# Patient Record
Sex: MALE | Race: WHITE | NOT HISPANIC OR LATINO | Employment: FULL TIME | URBAN - METROPOLITAN AREA
[De-identification: names, ages, dates, MRNs, and addresses within clinical notes are randomized per-mention and may not be internally consistent; named-entity substitution may affect disease eponyms.]

---

## 2022-05-23 DIAGNOSIS — Z87.438 HISTORY OF BPH: Primary | ICD-10-CM

## 2022-07-08 ENCOUNTER — OFFICE VISIT (OUTPATIENT)
Dept: UROLOGY | Facility: CLINIC | Age: 57
End: 2022-07-08
Payer: COMMERCIAL

## 2022-07-08 VITALS
BODY MASS INDEX: 23.57 KG/M2 | RESPIRATION RATE: 18 BRPM | SYSTOLIC BLOOD PRESSURE: 142 MMHG | HEIGHT: 72 IN | TEMPERATURE: 98 F | DIASTOLIC BLOOD PRESSURE: 80 MMHG | HEART RATE: 87 BPM | WEIGHT: 174 LBS

## 2022-07-08 DIAGNOSIS — R35.1 NOCTURIA: ICD-10-CM

## 2022-07-08 DIAGNOSIS — R35.0 URINARY FREQUENCY: ICD-10-CM

## 2022-07-08 DIAGNOSIS — R39.15 URINARY URGENCY: ICD-10-CM

## 2022-07-08 DIAGNOSIS — N13.8 BPH WITH URINARY OBSTRUCTION: Primary | ICD-10-CM

## 2022-07-08 DIAGNOSIS — N40.1 BPH WITH URINARY OBSTRUCTION: Primary | ICD-10-CM

## 2022-07-08 LAB — POC RESIDUAL URINE VOLUME: 43 ML (ref 0–100)

## 2022-07-08 PROCEDURE — 99204 OFFICE O/P NEW MOD 45 MIN: CPT | Mod: S$GLB,,, | Performed by: NURSE PRACTITIONER

## 2022-07-08 PROCEDURE — 99204 PR OFFICE/OUTPT VISIT, NEW, LEVL IV, 45-59 MIN: ICD-10-PCS | Mod: S$GLB,,, | Performed by: NURSE PRACTITIONER

## 2022-07-08 PROCEDURE — 51798 US URINE CAPACITY MEASURE: CPT | Mod: S$GLB,,, | Performed by: NURSE PRACTITIONER

## 2022-07-08 PROCEDURE — 51798 POCT BLADDER SCAN: ICD-10-PCS | Mod: S$GLB,,, | Performed by: NURSE PRACTITIONER

## 2022-07-08 RX ORDER — TAMSULOSIN HYDROCHLORIDE 0.4 MG/1
0.4 CAPSULE ORAL DAILY
Qty: 30 CAPSULE | Refills: 11 | Status: SHIPPED | OUTPATIENT
Start: 2022-07-08 | End: 2023-07-08

## 2022-07-08 NOTE — PROGRESS NOTES
Subjective:       Patient ID: Diogo Hayden is a 56 y.o. male.    Chief Complaint: Benign Prostatic Hypertrophy, Urinary Urgency (Leakage at times), and Nocturia (3x nightly)      HPI: 56-year-old male, new to [facility], referred for BPH.  Patient presents complaint of frequency, urgency, and nocturia.  Patient states his urgency is worse in his frequency.  He has proximally 3 episodes of nocturia per night.  On 04/27/2022 patient had a PSA of 1.85.    Patient denies any pain or burning urination.  Denies any odor urine.  Denies any fever body aches.  Denies any blood in urine.  Denies any significant weight loss.    Patient states he has already decreased his coffee, tea, and soda consumption.    No other urinary complaints at this time.       Past Medical History:   Past Medical History:   Diagnosis Date    BPH (benign prostatic hyperplasia)        Past Surgical Historical:   Past Surgical History:   Procedure Laterality Date    APPENDECTOMY      NECK SURGERY      disc repair        Medications:   Medication List with Changes/Refills   New Medications    TAMSULOSIN (FLOMAX) 0.4 MG CAP    Take 1 capsule (0.4 mg total) by mouth once daily.        Past Social History:   Social History     Socioeconomic History    Marital status: Unknown   Tobacco Use    Smoking status: Never Smoker    Smokeless tobacco: Never Used   Substance and Sexual Activity    Alcohol use: Yes     Comment: rarely    Drug use: Not Currently    Sexual activity: Yes     Partners: Female       Allergies:   Review of patient's allergies indicates:   Allergen Reactions    Clindamycin     Pcn [penicillins]         Family History:   Family History   Problem Relation Age of Onset    Benign prostatic hyperplasia Father     Cancer Paternal Grandfather         Review of Systems:  Review of Systems   Constitutional: Negative for activity change and appetite change.   HENT: Negative for congestion and dental problem.    Eyes: Negative for  visual disturbance.   Respiratory: Negative for chest tightness and shortness of breath.    Cardiovascular: Negative for chest pain.   Gastrointestinal: Negative for abdominal distention and abdominal pain.   Genitourinary: Positive for frequency and urgency. Negative for decreased urine volume, difficulty urinating, dysuria, enuresis, flank pain, genital sores, hematuria, penile discharge, penile pain, penile swelling, scrotal swelling and testicular pain.   Musculoskeletal: Negative for back pain and neck pain.   Skin: Negative for color change.   Neurological: Negative for dizziness.   Hematological: Negative for adenopathy.   Psychiatric/Behavioral: Negative for agitation, behavioral problems and confusion.       Physical Exam:  Physical Exam  Vitals and nursing note reviewed.   Constitutional:       Appearance: He is well-developed.   HENT:      Head: Normocephalic.   Eyes:      Pupils: Pupils are equal, round, and reactive to light.   Cardiovascular:      Rate and Rhythm: Normal rate and regular rhythm.      Heart sounds: Normal heart sounds.   Pulmonary:      Effort: Pulmonary effort is normal.      Breath sounds: Normal breath sounds.   Abdominal:      General: Bowel sounds are normal.      Palpations: Abdomen is soft.   Genitourinary:     Penis: Normal.       Prostate: Enlarged.      Rectum: Normal.      Comments: Prostate is enlarged.  Prostate is smooth with no nodules, nontender, and symmetrical.  Musculoskeletal:         General: Normal range of motion.      Cervical back: Normal range of motion and neck supple.   Skin:     General: Skin is warm and dry.   Neurological:      Mental Status: He is alert and oriented to person, place, and time.   Psychiatric:         Behavior: Behavior normal.       Urinalysis:  Normal  Bladder scan:  43 cc    Assessment/Plan:   BPH with obstruction/frequency/urgency/nocturia:  The patient had PSA done by his PCP and is stable at 1.85.  Did discusse dietary behavior  modifications.  Patient has already started dietary and behavior modifications but symptoms are still present.  Start patient on Flomax 0.4 mg daily.  Patient did ask about over-the-counter medications.  We did discuss saw palmetto.  Patient did request a prescription, however he may elect to try saw palmetto.    Will plan follow-up in 3-4 months for re-evaluation, sooner if needed.  Problem List Items Addressed This Visit    None     Visit Diagnoses     BPH with urinary obstruction    -  Primary    Relevant Medications    tamsulosin (FLOMAX) 0.4 mg Cap    Urinary frequency        Relevant Orders    POCT Urinalysis (w/Micro Option)    POCT Bladder Scan    Urinary urgency        Relevant Orders    POCT Urinalysis (w/Micro Option)    POCT Bladder Scan    Nocturia        Relevant Orders    POCT Urinalysis (w/Micro Option)    POCT Bladder Scan